# Patient Record
Sex: MALE | Race: WHITE | Employment: STUDENT | URBAN - METROPOLITAN AREA
[De-identification: names, ages, dates, MRNs, and addresses within clinical notes are randomized per-mention and may not be internally consistent; named-entity substitution may affect disease eponyms.]

---

## 2021-09-13 ENCOUNTER — OFFICE VISIT (OUTPATIENT)
Dept: ALLERGY | Facility: CLINIC | Age: 31
End: 2021-09-13
Payer: COMMERCIAL

## 2021-09-13 ENCOUNTER — LAB (OUTPATIENT)
Dept: LAB | Facility: CLINIC | Age: 31
End: 2021-09-13

## 2021-09-13 VITALS
DIASTOLIC BLOOD PRESSURE: 90 MMHG | OXYGEN SATURATION: 99 % | SYSTOLIC BLOOD PRESSURE: 147 MMHG | HEART RATE: 72 BPM | BODY MASS INDEX: 29.6 KG/M2 | WEIGHT: 206.3 LBS

## 2021-09-13 DIAGNOSIS — R53.83 FATIGUE, UNSPECIFIED TYPE: ICD-10-CM

## 2021-09-13 DIAGNOSIS — J30.1 SEASONAL ALLERGIC RHINITIS DUE TO POLLEN: Primary | ICD-10-CM

## 2021-09-13 LAB
ERYTHROCYTE [DISTWIDTH] IN BLOOD BY AUTOMATED COUNT: 12.7 % (ref 10–15)
HCT VFR BLD AUTO: 44.6 % (ref 40–53)
HGB BLD-MCNC: 15.7 G/DL (ref 13.3–17.7)
MCH RBC QN AUTO: 31.3 PG (ref 26.5–33)
MCHC RBC AUTO-ENTMCNC: 35.2 G/DL (ref 31.5–36.5)
MCV RBC AUTO: 89 FL (ref 78–100)
PLATELET # BLD AUTO: 232 10E3/UL (ref 150–450)
RBC # BLD AUTO: 5.01 10E6/UL (ref 4.4–5.9)
TSH SERPL DL<=0.005 MIU/L-ACNC: 2.09 MU/L (ref 0.4–4)
WBC # BLD AUTO: 7.2 10E3/UL (ref 4–11)

## 2021-09-13 PROCEDURE — 95004 PERQ TESTS W/ALRGNC XTRCS: CPT | Performed by: ALLERGY & IMMUNOLOGY

## 2021-09-13 PROCEDURE — 84443 ASSAY THYROID STIM HORMONE: CPT

## 2021-09-13 PROCEDURE — 82306 VITAMIN D 25 HYDROXY: CPT

## 2021-09-13 PROCEDURE — 36415 COLL VENOUS BLD VENIPUNCTURE: CPT

## 2021-09-13 PROCEDURE — 85027 COMPLETE CBC AUTOMATED: CPT

## 2021-09-13 PROCEDURE — 99203 OFFICE O/P NEW LOW 30 MIN: CPT | Mod: 25 | Performed by: ALLERGY & IMMUNOLOGY

## 2021-09-13 RX ORDER — ACYCLOVIR 200 MG/1
CAPSULE ORAL
COMMUNITY
Start: 2021-02-22

## 2021-09-13 NOTE — LETTER
"    9/13/2021         RE: Jovany Rubio  [address] [facility] [address]        Dear Colleague,    Thank you for referring your patient, Jovany Rubio, to the [facility]. Please see a copy of my visit note below.    Jovany Rubio was seen in the Allergy Clinic at [facility].    Jovany Rubio is a 31 year old White male being seen today in consultation for fatigue, congestion, sneezing, and runny nose concerning for allergic trigger.     Patient was living in his apartment for 6 years and is noticed for the past 3 or 4 years, that he has been feeling poorly intermittently (feeling like he is \" clogged up,\" groggy, low energy, congestion, sneezing, and runny nose).  He states that the air quality was poor in the building and that there was possibly mold.  He states that he was not having the symptoms when he was not in his building.  1 month ago to address the symptoms, he moved and was interested in getting allergy testing to see if this might be contributing to his fatigue.  His symptoms seem to be worse when it is \"muggier\" outside. He has been taking over-the-counter allergy medicine on and off for these issues.  Although they have helped, they have not taken away his symptoms completely.    He endorses vertigo for a week, which he attributed to not sleeping well.  He is unsure if he snores.  He does not consistently get good sleep and will sleep anywhere from 6 to 8 hours per night.  He denies weight loss, weight gain, shortness of breath, chest pain, nausea, vomiting, abdominal pain, numbness, tingling, and diarrhea.      Of note, he was recently seen by a neurologist for feelings of disassociation and inability to focus.  He underwent MRI, EEG, and received some lab work.  He has not gotten the results of this testing yet.  About 4 months ago, he was treated for a sinus infection.       Family History   Problem Relation Age of Onset     Alcohol/Drug Father 20 "        in  remission     History reviewed. No pertinent surgical history.    ENVIRONMENTAL HISTORY:   [place] The home is heated with a boiler. They does not have central air conditioning. The patient's bedroom is furnished with carpeting in bedroom and allergen mattress cover.  Pets inside the house include None. There is no history of cockroach or mice infestation. Do you smoke cigarettes or other recreational drugs? No Do you vape or use an e-cigarette? No. There is/are 0 smokers living in the house. There is/are 0 who smoke ecigarettes/vape living in the house. The house does not have a basement.     SOCIAL HISTORY:   Jovany is not currently working. He lives with his alone.    REVIEW OF SYSTEMS:  10 point review of systems negative except for that noted above    No current outpatient medications on file.  Immunization History   Administered Date(s) Administered     COVID-19,PF,Pfizer 05/08/2021, 05/29/2021     Allergies   Allergen Reactions     Sulfa Drugs          EXAM:   BP (!) 147/90 (BP Location: Right arm, Patient Position: Sitting, Cuff Size: Adult Regular)   Pulse 72   Wt 93.6 kg (206 lb 4.8 oz)   SpO2 99%   BMI 29.60 kg/m    GENERAL APPEARANCE: alert, cooperative, not in distress, well developed, well groomed and well nourished  SKIN: no rashes, no lesions, no ecchymoses  HEAD: atraumatic, normocephalic  EYES: lids and lashes normal, conjunctivae and sclerae clear, EOM full and intact  ENT: no scars or lesions, midline dorsum, nasal mucosa normal without drainage or edema, normal posterior oropharynx  NECK: no asymmetry, masses, or scars  LUNGS: unlabored respirations, no intercostal retractions or accessory muscle use, clear to auscultation without rales or wheezes  HEART: regular rate and rhythm without murmurs and normal S1 and S2  NEURO: cranial nerves II through VI grossly intact  PSYCH: does not appear depressed or anxious    WORKUP: Skin  testing    ENVIRONMENTAL PERCUTANEOUS SKIN TESTING: ADULT  [place] [employer] 9/13/2021   Consent Y   Ordering Physician Dr. Avila   Interpreting Physician Dr. Avila   Testing Technician Leeann FREEMAN RN   Location Back   Time start:  2:50 PM   Time End:  3:10 PM   Positive Control: Histatrol*ALK 1 mg/ml 6/25   Negative Control: 50% Glycerin 0   Cat Hair*ALK (10,000 BAU/ml) 0   AP Dog Hair/Dander (1:100 w/v) 0   Dust Mite p. 30,000 AU/ml 0   Dust Mite f. (30,000 AU/ml) 0   Yfn (W/F in millimeters) 0   Griffin Grass (100,000 BAU/mL) 6/25   Red [place] (W/F in millimeters) 0   Maple/[place] (W/F in millimeters) 0   Hackberry (W/F in millimeters) 0   [place] (W/F in millimeters) 0   [place] *ALK (W/F in millimeters) 0   American Elm (W/F in millimeters) 0   [place] (W/F in millimeters) 0   Black [place] (W/F in millimeters) 0   Birch Mix (W/F in millimeters) 0   [place] (W/F in millimeters) 0   Oak (W/F in millimeters) 0   Cocklebur (W/F in millimeters) 0   [place] (W/F in millimeters) 0   White Dave (W/F in millimeters) 0   Careless (W/F in millimeters) 0   Nettle (W/F in millimeters) 0   English Plantain (W/F in millimeters) 0   Kochia (W/F in millimeters) 0   Lamb's Quarter (W/F in millimeters) 0   Marshelder (W/F in millimeters) 0   Ragweed Mix* ALK (W/F in millimeters) 0   Russian Thistle (W/F in millimeters) 0   Sagebrush/Mugwort (W/F in millimeters) 0   Sheep Sorrel (W/F in millimeters) 0   Feather Mix* ALK (W/F in millimeters) 0   Penicillium Mix (1:10 w/v) 0   Curvularia spicifera (1:10 w/v) 0   Epicoccum (1:10 w/v) 0   Aspergillus fumigatus (1:10 w/v): 0   Alternaria tenius (1:10 w/v) 0   H. Cladosporium (1:10 w/v) 0   Phoma herbarum (1:10 w/v) 0      Appropriate response to controls, positive to grass pollen    ASSESSMENT/PLAN:  Jovany Rubio is a 31 year old male with a history of grogginess, low energy, congestion, sneezing, and runny nose for the past several years, concerning for allergy versus  anemia versus hypothyroidism versus vitamin deficiency versus sleep apnea.  Skin testing positive for grass pollen, which would not account for patient's perennial symptoms, making allergy less likely.  -- Labs obtained to: CBC, TSH, vitamin D level  -- Based on lab findings, will consider referral to sleep medicine  -- We will contact patient with lab results  -- Encouraged to establish with a primary care provider    Follow-up edgardo Hardy MD  PGY-4 Internal Medicine-Pediatrics  [facility]      Physician Attestation   I, Celia Avila MD, saw this patient and agree with the findings and plan of care as documented in the note.      1. Seasonal allergic rhinitis due to pollen    - recommend taking second generation H1 antihistamine such as cetirizine, loratadine, or fexofenadine daily as needed  - ALLERGY SKIN TESTS,ALLERGENS [[id number]]    2. Fatigue, unspecified type    - CBC with platelets; Future  - Vitamin D deficiency screening; Future  - TSH; Future  - ALLERGY SKIN TESTS,ALLERGENS [[id number]]      Thank you for allowing me to participate in the care of Jovany Rubio.      Celia Avila MD, FAAAAI  Allergy/Immunology  [facility]      Chart documentation done in part with Dragon Voice Recognition Software. Although reviewed after completion, some word and grammatical errors may remain.        Again, thank you for allowing me to participate in the care of your patient.        Sincerely,        Celia Avila MD

## 2021-09-13 NOTE — PROGRESS NOTES
"Jovany Rubio was seen in the Allergy Clinic at [facility].    Jovany Rubio is a 31 year old White male being seen today in consultation for fatigue, congestion, sneezing, and runny nose concerning for allergic trigger.     Patient was living in his apartment for 6 years and is noticed for the past 3 or 4 years, that he has been feeling poorly intermittently (feeling like he is \" clogged up,\" groggy, low energy, congestion, sneezing, and runny nose).  He states that the air quality was poor in the building and that there was possibly mold.  He states that he was not having the symptoms when he was not in his building.  1 month ago to address the symptoms, he moved and was interested in getting allergy testing to see if this might be contributing to his fatigue.  His symptoms seem to be worse when it is \"muggier\" outside. He has been taking over-the-counter allergy medicine on and off for these issues.  Although they have helped, they have not taken away his symptoms completely.    He endorses vertigo for a week, which he attributed to not sleeping well.  He is unsure if he snores.  He does not consistently get good sleep and will sleep anywhere from 6 to 8 hours per night.  He denies weight loss, weight gain, shortness of breath, chest pain, nausea, vomiting, abdominal pain, numbness, tingling, and diarrhea.      Of note, he was recently seen by a neurologist for feelings of disassociation and inability to focus.  He underwent MRI, EEG, and received some lab work.  He has not gotten the results of this testing yet.  About 4 months ago, he was treated for a sinus infection.       Family History   Problem Relation Age of Onset     Alcohol/Drug Father 20        in  remission     History reviewed. No pertinent surgical history.    ENVIRONMENTAL HISTORY:   [place] The home is heated with a boiler. They does not have central air conditioning. The " patient's bedroom is furnished with carpeting in bedroom and allergen mattress cover.  Pets inside the house include None. There is no history of cockroach or mice infestation. Do you smoke cigarettes or other recreational drugs? No Do you vape or use an e-cigarette? No. There is/are 0 smokers living in the house. There is/are 0 who smoke ecigarettes/vape living in the house. The house does not have a basement.     SOCIAL HISTORY:   Jovany is not currently working. He lives with his alone.    REVIEW OF SYSTEMS:  10 point review of systems negative except for that noted above    No current outpatient medications on file.  Immunization History   Administered Date(s) Administered     COVID-19,PF,Pfizer 05/08/2021, 05/29/2021     Allergies   Allergen Reactions     Sulfa Drugs          EXAM:   BP (!) 147/90 (BP Location: Right arm, Patient Position: Sitting, Cuff Size: Adult Regular)   Pulse 72   Wt 93.6 kg (206 lb 4.8 oz)   SpO2 99%   BMI 29.60 kg/m    GENERAL APPEARANCE: alert, cooperative, not in distress, well developed, well groomed and well nourished  SKIN: no rashes, no lesions, no ecchymoses  HEAD: atraumatic, normocephalic  EYES: lids and lashes normal, conjunctivae and sclerae clear, EOM full and intact  ENT: no scars or lesions, midline dorsum, nasal mucosa normal without drainage or edema, normal posterior oropharynx  NECK: no asymmetry, masses, or scars  LUNGS: unlabored respirations, no intercostal retractions or accessory muscle use, clear to auscultation without rales or wheezes  HEART: regular rate and rhythm without murmurs and normal S1 and S2  NEURO: cranial nerves II through VI grossly intact  PSYCH: does not appear depressed or anxious    WORKUP: Skin testing    ENVIRONMENTAL PERCUTANEOUS SKIN TESTING: ADULT  [place] [employer] 9/13/2021   Consent Y   Ordering Physician Dr. Avila   Interpreting Physician Dr. Avila   Testing Technician Leeann FREEMAN RN   Location Back   Time start:  2:50 PM    Time End:  3:10 PM   Positive Control: Histatrol*ALK 1 mg/ml 6/25   Negative Control: 50% Glycerin 0   Cat Hair*ALK (10,000 BAU/ml) 0   AP Dog Hair/Dander (1:100 w/v) 0   Dust Mite p. 30,000 AU/ml 0   Dust Mite f. (30,000 AU/ml) 0   Yfn (W/F in millimeters) 0   Griffin Grass (100,000 BAU/mL) 6/25   Red [place] (W/F in millimeters) 0   Maple/[place] (W/F in millimeters) 0   Hackberry (W/F in millimeters) 0   [place] (W/F in millimeters) 0   [place] *ALK (W/F in millimeters) 0   American Elm (W/F in millimeters) 0   [place] (W/F in millimeters) 0   Black [place] (W/F in millimeters) 0   Birch Mix (W/F in millimeters) 0   [place] (W/F in millimeters) 0   Oak (W/F in millimeters) 0   Cocklebur (W/F in millimeters) 0   [place] (W/F in millimeters) 0   White Dave (W/F in millimeters) 0   Careless (W/F in millimeters) 0   Nettle (W/F in millimeters) 0   English Plantain (W/F in millimeters) 0   Kochia (W/F in millimeters) 0   Lamb's Quarter (W/F in millimeters) 0   Marshelder (W/F in millimeters) 0   Ragweed Mix* ALK (W/F in millimeters) 0   Russian Thistle (W/F in millimeters) 0   Sagebrush/Mugwort (W/F in millimeters) 0   Sheep Sorrel (W/F in millimeters) 0   Feather Mix* ALK (W/F in millimeters) 0   Penicillium Mix (1:10 w/v) 0   Curvularia spicifera (1:10 w/v) 0   Epicoccum (1:10 w/v) 0   Aspergillus fumigatus (1:10 w/v): 0   Alternaria tenius (1:10 w/v) 0   H. Cladosporium (1:10 w/v) 0   Phoma herbarum (1:10 w/v) 0      Appropriate response to controls, positive to grass pollen    ASSESSMENT/PLAN:  Jovany Rubio is a 31 year old male with a history of grogginess, low energy, congestion, sneezing, and runny nose for the past several years, concerning for allergy versus anemia versus hypothyroidism versus vitamin deficiency versus sleep apnea.  Skin testing positive for grass pollen, which would not account for patient's perennial symptoms, making allergy less likely.  -- Labs obtained to: CBC, TSH, vitamin D  level  -- Based on lab findings, will consider referral to sleep medicine  -- We will contact patient with lab results  -- Encouraged to establish with a primary care provider    Follow-up edgardo Hardy MD  PGY-4 Internal Medicine-Pediatrics  [facility]      Physician Attestation   I, Celia Avila MD, saw this patient and agree with the findings and plan of care as documented in the note.      1. Seasonal allergic rhinitis due to pollen    - recommend taking second generation H1 antihistamine such as cetirizine, loratadine, or fexofenadine daily as needed  - ALLERGY SKIN TESTS,ALLERGENS [[id number]]    2. Fatigue, unspecified type    - CBC with platelets; Future  - Vitamin D deficiency screening; Future  - TSH; Future  - ALLERGY SKIN TESTS,ALLERGENS [[id number]]      Thank you for allowing me to participate in the care of Jovany Rubio.      Celia Avila MD, FAAAAI  Allergy/Immunology  [facility]      Chart documentation done in part with Dragon Voice Recognition Software. Although reviewed after completion, some word and grammatical errors may remain.

## 2021-09-13 NOTE — PATIENT INSTRUCTIONS
If you have any questions regarding your allergies, asthma, or what we discussed during your visit today please call the allergy clinic or contact us via [phone].    [facility] Allergy RN Line: [phone] - call this number with any questions during or after business/clinic hours  [facility] [place] Scheduling Line: [phone]  [facility] Scheduling Line: [phone] - this number is ONLY for scheduling and should not be used to get in touch with the allergy team    Clinic Schedule:   [place] - Monday, Tuesday, and Thursday  [address] [place] [address]    [facility] - Wednesday  [address] [place] [address]      It Was A Pleasure To Meet You Today!    Here is a recap of our visit today:  -- We ordered a blood count, checked your thyroid, and checked your vitamin level.   -- We did a skin prick test today to check for any response to allergens.       ENVIRONMENTAL PERCUTANEOUS SKIN TESTING: ADULT  [place] [employer] 9/13/2021   Consent Y   Ordering Physician Dr. Avila   Interpreting Physician Dr. Avila   Testing Technician Leeann FREEMAN RN   Location Back   Time start:  2:50 PM   Time End:  3:10 PM   Positive Control: Histatrol*ALK 1 mg/ml 6/25   Negative Control: 50% Glycerin 0   Cat Hair*ALK (10,000 BAU/ml) 0   AP Dog Hair/Dander (1:100 w/v) 0   Dust Mite p. 30,000 AU/ml 0   Dust Mite f. (30,000 AU/ml) 0   Yfn (W/F in millimeters) 0   Griffin Grass (100,000 BAU/mL) 6/25   Red [place] (W/F in millimeters) 0   Maple/[place] (W/F in millimeters) 0   Hackberry (W/F in millimeters) 0   [place] (W/F in millimeters) 0   [place] *ALK (W/F in millimeters) 0   American Elm (W/F in millimeters) 0   [place] (W/F in millimeters) 0   Black [place] (W/F in millimeters) 0   Birch Mix (W/F in millimeters) 0   [place] (W/F in millimeters) 0   Oak (W/F in millimeters) 0   [place]lebur (W/F in millimeters) 0   [place] (W/F  in millimeters) 0   White Dave (W/F in millimeters) 0   Careless (W/F in millimeters) 0   Nettle (W/F in millimeters) 0   English Plantain (W/F in millimeters) 0   Kochia (W/F in millimeters) 0   Lamb's Quarter (W/F in millimeters) 0   Marshelder (W/F in millimeters) 0   Ragweed Mix* ALK (W/F in millimeters) 0   Russian Thistle (W/F in millimeters) 0   Sagebrush/Mugwort (W/F in millimeters) 0   Sheep Sorrel (W/F in millimeters) 0   Feather Mix* ALK (W/F in millimeters) 0   Penicillium Mix (1:10 w/v) 0   Curvularia spicifera (1:10 w/v) 0   Epicoccum (1:10 w/v) 0   Aspergillus fumigatus (1:10 w/v): 0   Alternaria tenius (1:10 w/v) 0   H. Cladosporium (1:10 w/v) 0   Phoma herbarum (1:10 w/v) 0

## 2021-09-14 LAB — DEPRECATED CALCIDIOL+CALCIFEROL SERPL-MC: 23 UG/L (ref 20–75)

## 2021-09-19 ENCOUNTER — TELEPHONE (OUTPATIENT)
Dept: ALLERGY | Facility: CLINIC | Age: 31
End: 2021-09-19

## 2021-09-20 NOTE — TELEPHONE ENCOUNTER
Please call patient regarding lab results. Thyroid studies, vitamin D level, and blood cell counts are all normal. He should continue to follow-up with his PCP and neurologist regarding his fatigue. Follow-up in the allergy clinic as needed.

## 2021-09-21 NOTE — TELEPHONE ENCOUNTER
RN left second message for patient to return call to RN's direct line @ [phone].    Leeann Lowe RN

## 2021-09-27 NOTE — TELEPHONE ENCOUNTER
RN spoke with patient regarding lab results. Patient verbalized understanding. No further questions or concerns.         Leeann Lowe RN